# Patient Record
Sex: FEMALE | Race: WHITE | URBAN - METROPOLITAN AREA
[De-identification: names, ages, dates, MRNs, and addresses within clinical notes are randomized per-mention and may not be internally consistent; named-entity substitution may affect disease eponyms.]

---

## 2024-01-15 ENCOUNTER — OFFICE VISIT (OUTPATIENT)
Age: 59
End: 2024-01-15
Payer: MEDICAID

## 2024-01-15 VITALS
TEMPERATURE: 97.2 F | DIASTOLIC BLOOD PRESSURE: 72 MMHG | BODY MASS INDEX: 38.58 KG/M2 | WEIGHT: 226 LBS | HEART RATE: 69 BPM | HEIGHT: 64 IN | SYSTOLIC BLOOD PRESSURE: 136 MMHG | OXYGEN SATURATION: 98 %

## 2024-01-15 DIAGNOSIS — M43.16 SPONDYLOLISTHESIS, LUMBAR REGION: ICD-10-CM

## 2024-01-15 DIAGNOSIS — M47.817 LUMBOSACRAL SPONDYLOSIS WITHOUT MYELOPATHY: ICD-10-CM

## 2024-01-15 DIAGNOSIS — M51.37 DDD (DEGENERATIVE DISC DISEASE), LUMBOSACRAL: ICD-10-CM

## 2024-01-15 DIAGNOSIS — M54.16 LUMBAR NEURITIS: ICD-10-CM

## 2024-01-15 DIAGNOSIS — M54.50 LUMBAR SPINE PAIN: Primary | ICD-10-CM

## 2024-01-15 PROCEDURE — 72100 X-RAY EXAM L-S SPINE 2/3 VWS: CPT | Performed by: PHYSICAL MEDICINE & REHABILITATION

## 2024-01-15 PROCEDURE — 99204 OFFICE O/P NEW MOD 45 MIN: CPT | Performed by: PHYSICAL MEDICINE & REHABILITATION

## 2024-01-15 RX ORDER — ASPIRIN 325 MG
325 TABLET ORAL DAILY
COMMUNITY

## 2024-01-15 RX ORDER — LEVOTHYROXINE SODIUM 0.05 MG/1
50 TABLET ORAL DAILY
COMMUNITY

## 2024-01-15 RX ORDER — ACETAMINOPHEN 160 MG
TABLET,DISINTEGRATING ORAL
COMMUNITY

## 2024-01-15 RX ORDER — PANTOPRAZOLE SODIUM 40 MG/1
40 TABLET, DELAYED RELEASE ORAL DAILY
COMMUNITY

## 2024-01-15 RX ORDER — MECLIZINE HYDROCHLORIDE 25 MG/1
25 TABLET ORAL 3 TIMES DAILY PRN
COMMUNITY

## 2024-01-15 RX ORDER — MONTELUKAST SODIUM 10 MG/1
10 TABLET ORAL NIGHTLY
COMMUNITY

## 2024-01-15 RX ORDER — BACLOFEN 10 MG/1
10 TABLET ORAL 3 TIMES DAILY
COMMUNITY
Start: 2023-12-28

## 2024-01-15 RX ORDER — ROSUVASTATIN CALCIUM 10 MG/1
10 TABLET, COATED ORAL DAILY
COMMUNITY

## 2024-01-15 RX ORDER — CELECOXIB 100 MG/1
100 CAPSULE ORAL 2 TIMES DAILY
COMMUNITY

## 2024-01-15 RX ORDER — LISINOPRIL 10 MG/1
10 TABLET ORAL DAILY
COMMUNITY

## 2024-01-15 RX ORDER — ESTRADIOL 0.5 MG/1
0.5 TABLET ORAL DAILY
COMMUNITY

## 2024-01-15 RX ORDER — BACLOFEN 5 MG/1
5 TABLET ORAL 3 TIMES DAILY PRN
COMMUNITY
Start: 2023-12-28

## 2024-01-15 RX ORDER — FAMOTIDINE 40 MG/1
40 TABLET, FILM COATED ORAL NIGHTLY PRN
COMMUNITY
Start: 2024-01-04

## 2024-01-15 RX ORDER — SPIRONOLACTONE 25 MG/1
25 TABLET ORAL DAILY
COMMUNITY

## 2024-01-15 RX ORDER — MULTIVIT WITH MINERALS/LUTEIN
250 TABLET ORAL DAILY
COMMUNITY

## 2024-01-15 RX ORDER — VITAMIN B COMPLEX
1 CAPSULE ORAL DAILY
COMMUNITY

## 2024-01-15 RX ORDER — GABAPENTIN 300 MG/1
300 CAPSULE ORAL 3 TIMES DAILY
Qty: 90 CAPSULE | Refills: 1 | Status: SHIPPED | OUTPATIENT
Start: 2024-01-15 | End: 2024-03-15

## 2024-01-15 NOTE — PROGRESS NOTES
[facility]  [address]  [place] [id number]  Tel: [phone]  Fax: [phone]          INITIAL CONSULTATION      HISTORY OF PRESENT ILLNESS:  Rosanna Roman is a 58 y.o. female who is referred from Rivka Lewis NP secondary to lumbar facet arthropathy and SI joint inflammation. She rates her pain 10/10. Patient comes into the office with c/o low back pain radiating to the LLE in a S1 distribution to the foot, involves digit 5. She says her symptoms started in 2023 w/o injury. Patient says her pain is progressive in nature.Pt denies change in bowel or bladder habits. Her pain is exacerbated by no position. Patient denies recent fevers, weight loss, rashes, or skin sores. No stomach ulcers or bleeding disorders. No history of spinal surgery or injections. She had not physical therapy or chiropractic care. The patient has a history of DM and reports blood sugars are well controlled, consistently remaining below 200, followed by Dr. Solorzano, endocrinologist in [place].Patient reports that to her knowledge her kidneys function properly, no labs available. She did not tolerate the Tramadol. She had some benefit with tylenol. She has taken Celebrex without benefit. She took Baclofen without benefit. She is currently taking Aspirin. She has taken Prednisone x 2 with temporary benefit in 2023 PmHx of DM, HTN. Note from Rivka Lewis NP dated 1/3/2024 indicating: XR of left hip is normal but shows degenerative changes in the lumbar spine. Recommended she continue with PT and referred to me.       The patient is Right Handed.  reviewed. Tramadol 2024 from Rivka Lewis NP Body mass index is 38.79 kg/m².    PCP: Rivka Lewis APRN - NP    History reviewed. No pertinent past medical history.       Past Surgical History:   Procedure Laterality Date    ANKLE FRACTURE SURGERY       SECTION      HYSTERECTOMY, VAGINAL

## 2024-01-19 ENCOUNTER — TELEPHONE (OUTPATIENT)
Age: 59
End: 2024-01-19

## 2024-01-19 NOTE — TELEPHONE ENCOUNTER
Patient states the Gabapentin medication she was prescribed is not easing her pain, and is asking if there is anything else she can take to help with her pain. Patient is asking for a call back , and can be reached at [phone].

## 2024-01-19 NOTE — TELEPHONE ENCOUNTER
Patient verified first and last name on her vm. Left a vm stating that rx can take a minimum of two weeks to see any effect. Office number was provided for call back if patient had any more questions or concerns.

## 2024-01-24 ENCOUNTER — APPOINTMENT (OUTPATIENT)
Age: 59
End: 2024-01-24
Payer: MEDICAID

## 2024-01-31 ENCOUNTER — HOSPITAL ENCOUNTER (OUTPATIENT)
Age: 59
Setting detail: RECURRING SERIES
Discharge: HOME | End: 2024-02-03
Payer: MEDICAID

## 2024-01-31 PROCEDURE — 97161 PT EVAL LOW COMPLEX 20 MIN: CPT

## 2024-01-31 NOTE — THERAPY EVALUATION
THORACOLUMBAR EVAL/ PT DAILY TREATMENT NOTE 10-18    Patient Name: Rosanna Roman  Date:2024  : 1965  [x]  Patient  Verified  Payor: [facility] MEDICAID / Plan: [facility] EXP ANTH HEALTHKEEPERS PLUS / Product Type: *No Product type* /    In time:1355  Out time:1450  Total Treatment Time (min): 55  Visit #: 1      Treatment Area: Low back pain, unspecified [M54.50]  Spondylolisthesis, lumbar region [M43.16]  Other intervertebral disc degeneration, lumbosacral region [M51.37]  Radiculopathy, lumbar region [M54.16]  Spondylosis without myelopathy or radiculopathy, lumbosacral region [M47.817]      Subjective:     Pt reports L sided back pain that goes all the way down to her L toes. Pain started in 2023. LLE pain started late December. Is on gabapentin since 3 weeks ago, has been helping. Walking helps relieve pain but sometimes it makes it worse. Pt lives in one story home with , 5 steps to get into home.     Patient Goals: be able to do housework     Pain Level (0-10 scale): current: 2/10 Best: 0/10 worst: 10/10  []Sharp  []Dull  []Achy []Burning []Throbbing []N&T []Other:  []constant []intermittent []improving []worsening []no change since onset      Symptoms:  Aggravated by:   [x] Bending [] Sitting [] Standing [x] Walking   [x] Moving [] Cough [] Sneeze [] Valsalva   [] AM  [] PM  Lying:  [] sup   [] pro   [] sidelying   [] Other:     Eased by:    [] Bending [x] Sitting [x] Standing [] Walking   [] Moving [] AM  [] PM  Lying: [] sup  [] pro  [x] sidelying   [] Other:     General Health:  Red Flags Indicated? [] Yes    [x] No    No past medical history on file.    Any medication changes, allergies to medications, adverse drug reactions, diagnosis change, or new procedure performed?: [x] No    [] Yes (see summary sheet for update)    OBJECTIVE      Active Movements: [] N/A   [] Too acute   [] Other:  Thoracolumbar ROM  AROM PROM Comments:pain, area   Forward flexion  75%   
limitation and / or participation in recreation  Presentation: LOW Complexity : Stable, uncomplicated  Clinical Decision Making:MEDIUM Complexity : FOTO score of 26-74Overall Complexity:LOW     Problem List: pain affecting function, decrease ROM, decrease strength, decrease ADL/ functional abilitiies, decrease activity tolerance, and decrease flexibility/ joint mobility   Treatment Plan may include any combination of the following: Theraputic Exercise, Moist Heat, Cryotherapy, Electrical Stimulation, Traction, Manual Therapy, Gait and Balance Training, and Teaching of a HEP  Patient / Family readiness to learn indicated by: asking questions, trying to perform skills, interest, return verbalization , and return demonstration   Persons(s) to be included in education: patient (P)  Barriers to Learning/Limitations: No      Patient self reported health status: good  Rehabilitation Potential: good    Objective Measures:    Active Movements: [] N/A   [] Too acute   [] Other:  Thoracolumbar ROM  AROM PROM Comments:pain, area   Forward flexion  75%     Extension  50%     SB right  50%     SB left  50%     Rotation right  75%     Rotation left  75%         Neuro Screen [x] WNL    Dural Mobility:  SLR Sitting: [] R    [] L    [] +    [x] -  @ (degrees):           Supine: [] R    [x] L    [x] +    [] -  @ (degrees):   Slump Test: [] R    [] L    [] +    [x] -  @ (degrees):   Prone Knee Bend: [] R    [] L    [] +    [] -             AROM                       PROM    MMT    Left Right Left Right Left Right   Hip Flexion     4 4+    Extension     3+ 4    Abduction     3+ 4    Adduction          Internal Rotation          External rotation         Knee Flexion     5 5    Extension     5 5   Ankle Dorsiflexion     5 5    Plantarflexion     5 5    Inversion          Eversion             Special Tests         Hip: Arthur:  [] R    [] L    [] +    [] -     Scour:  [] R    [] L    [] +    [] -     Piriformis: [] R    [x] L    [x] +

## 2024-02-02 ENCOUNTER — HOSPITAL ENCOUNTER (OUTPATIENT)
Age: 59
Setting detail: RECURRING SERIES
Discharge: HOME | End: 2024-02-05
Payer: MEDICAID

## 2024-02-02 PROCEDURE — 97110 THERAPEUTIC EXERCISES: CPT

## 2024-02-02 PROCEDURE — 97112 NEUROMUSCULAR REEDUCATION: CPT

## 2024-02-02 NOTE — PROGRESS NOTES
Physical Therapy  PT DAILY TREATMENT NOTE 8-    Patient Name: Rosanna Roman  Date:2024  : 1965  [x]  Patient  Verified  Payor: [facility] MEDICAID / Plan: [facility] EXP ANTH HEALTHKEEPERS PLUS / Product Type: *No Product type* /    In time:1136  Out time:1215  Total Treatment Time (min): 39  Total Timed Codes (min): 39  1:1 Treatment Time (min): 39   Visit #: 2 of 12    Treatment Area: Low back pain, unspecified [M54.50]  Spondylolisthesis, lumbar region [M43.16]  Other intervertebral disc degeneration, lumbosacral region [M51.37]  Radiculopathy, lumbar region [M54.16]  Spondylosis without myelopathy or radiculopathy, lumbosacral region [M47.817]    SUBJECTIVE  Patient states LBP 2/10 today with minimum pain down L LE.    Pain Level (0-10 scale): 2/10    Any medication changes, allergies to medications, adverse drug reactions, diagnosis change, or new procedure performed?: [x] No    [] Yes (see summary sheet for update)        OBJECTIVE  15 min Therapeutic Exercise:  [x] See flow sheet.   Rationale: increase ROM and increase strength to improve the patient’s ability to decrease pain with ADL performance.       24 min Neuromuscular Re-education:  [x]  See flow sheet.   Rationale: increase strength and improve coordination  to improve the patient’s ability to activate core mm for increased protection of back with ADL performance.           With TE  TA   NR  GT   Misc Patient Education: [x] Review HEP    [] Progressed/Changed HEP based on:   [] positioning   [] body mechanics   [] transfers   [] heat/ice application          Pain Level (0-10 scale) post treatment: 2/10    ASSESSMENT/Changes in Function: Patient begins session on stepper for 10' set to level 1, challenging LE strength, activity endurance & encouraging normal motion of low back & pelvis. Patient follows with mat activities to stretch piriformis, decreasing pain in L LE; patient states she feels a stretch more so when performing R

## 2024-02-05 ENCOUNTER — HOSPITAL ENCOUNTER (OUTPATIENT)
Age: 59
Setting detail: RECURRING SERIES
Discharge: HOME | End: 2024-02-08
Payer: MEDICAID

## 2024-02-05 PROCEDURE — 97110 THERAPEUTIC EXERCISES: CPT

## 2024-02-05 PROCEDURE — 97112 NEUROMUSCULAR REEDUCATION: CPT

## 2024-02-05 NOTE — PROGRESS NOTES
Physical Therapy  PT DAILY TREATMENT NOTE     Patient Name: Rosanna Roman  Date:2024  : 1965  [x]  Patient  Verified  Payor: [facility] MEDICAID / Plan: [facility] EXP ANTH HEALTHKEEPERS PLUS / Product Type: *No Product type* /    In time:926  Out time:1003  Total Treatment Time (min): 37  Total Timed Codes (min): 37  1:1 Treatment Time (min): 37   Visit #: 3 of 12    Treatment Area: Low back pain, unspecified [M54.50]  Spondylolisthesis, lumbar region [M43.16]  Other intervertebral disc degeneration, lumbosacral region [M51.37]  Radiculopathy, lumbar region [M54.16]  Spondylosis without myelopathy or radiculopathy, lumbosacral region [M47.817]    SUBJECTIVE  Patient states no change in pain level since last visit. Patient states she felt soreness on R hip & R-sided trunk area post-PT last session.    Pain Level (0-10 scale): 2/10    Any medication changes, allergies to medications, adverse drug reactions, diagnosis change, or new procedure performed?: [x] No    [] Yes (see summary sheet for update)        OBJECTIVE    22 min Therapeutic Exercise:  [x] See flow sheet.   Rationale: increase ROM and increase strength to improve the patient’s ability to perform ADL with increased strength & safety.       15 min Neuromuscular Re-education:  [x]  See flow sheet.   Rationale: increase strength and muscular re-education of core   to improve the patient’s ability to perform ADL with increased core awareness with performance.           With TE  TA   NR  GT   Misc Patient Education: [x] Review HEP    [] Progressed/Changed HEP based on:   [] positioning   [] body mechanics   [] transfers   [] heat/ice application          Pain Level (0-10 scale) post treatment: 2/10    ASSESSMENT/Changes in Function: Patient begins session on stepper, loosening low back joints, preparing patient for therapeutic & neuromuscular re-education exercises ahead. Patient performs mat activities with no cues needed when

## 2024-02-07 ENCOUNTER — HOSPITAL ENCOUNTER (OUTPATIENT)
Age: 59
Setting detail: RECURRING SERIES
Discharge: HOME | End: 2024-02-10
Payer: MEDICAID

## 2024-02-07 PROCEDURE — 97140 MANUAL THERAPY 1/> REGIONS: CPT

## 2024-02-07 PROCEDURE — 97112 NEUROMUSCULAR REEDUCATION: CPT

## 2024-02-07 PROCEDURE — 97110 THERAPEUTIC EXERCISES: CPT

## 2024-02-07 NOTE — PROGRESS NOTES
Physical Therapy  PT DAILY TREATMENT NOTE     Patient Name: Rosanna Roman  Date:2024  : 1965  [x]  Patient  Verified  Payor: [facility] MEDICAID / Plan: [facility] EXP ANTH HEALTHKEEPERS PLUS / Product Type: *No Product type* /    In time:955  Out time:1040  Total Treatment Time (min): 45  Total Timed Codes (min): 45  1:1 Treatment Time (min): 45   Visit #: 4 of 12    Treatment Area: Low back pain, unspecified [M54.50]  Spondylolisthesis, lumbar region [M43.16]  Other intervertebral disc degeneration, lumbosacral region [M51.37]  Radiculopathy, lumbar region [M54.16]  Spondylosis without myelopathy or radiculopathy, lumbosacral region [M47.817]    SUBJECTIVE  Patient states she has been stiff throughout entire trunk area since last visit & pain that shoots from L hip up into low back.    Pain Level (0-10 scale): 2/10    Any medication changes, allergies to medications, adverse drug reactions, diagnosis change, or new procedure performed?: [x] No    [] Yes (see summary sheet for update)        OBJECTIVE    21 min Therapeutic Exercise:  [x] See flow sheet.   Rationale: increase ROM, increase strength, and improve coordination to improve the patient’s ability to perform ADL with little to no pain.      14 min Neuromuscular Re-education:  [x]  See flow sheet.   Rationale: increase strength and improve coordination  to improve the patient’s ability to activate core mm throughout ADL performance.     10 min Manual Therapy:  Patient receives soft tissue massage, moderate pressure, to L gluteal region in attempts to relax tense mm to alleviate sciatica pain. Patient receives different massage techniques for relaxation & TP work to decrease mm knots in glute mm of L hip.   Rationale: decrease pain, increase ROM, increase tissue extensibility, and decrease trigger points to decrease pain when performing ADL.               With TE  TA   NR  GT   Misc Patient Education: [x] Review HEP    []

## 2024-02-12 ENCOUNTER — HOSPITAL ENCOUNTER (OUTPATIENT)
Age: 59
Setting detail: RECURRING SERIES
Discharge: HOME | End: 2024-02-15
Payer: MEDICAID

## 2024-02-12 PROCEDURE — 97112 NEUROMUSCULAR REEDUCATION: CPT

## 2024-02-12 PROCEDURE — 97140 MANUAL THERAPY 1/> REGIONS: CPT

## 2024-02-12 PROCEDURE — 97110 THERAPEUTIC EXERCISES: CPT

## 2024-02-12 NOTE — PROGRESS NOTES
Physical Therapy  PT DAILY TREATMENT NOTE 8-    Patient Name: Rosanna Roman  Date:2024  : 1965  [x]  Patient  Verified  Payor: [facility] MEDICAID / Plan: [facility] EXP ANTH HEALTHKEEPERS PLUS / Product Type: *No Product type* /    In time:1030  Out time:1115  Total Treatment Time (min): 45  Total Timed Codes (min): 45  1:1 Treatment Time (min): 45   Visit #: 5 of 12    Treatment Area: Low back pain, unspecified [M54.50]  Spondylolisthesis, lumbar region [M43.16]  Other intervertebral disc degeneration, lumbosacral region [M51.37]  Radiculopathy, lumbar region [M54.16]  Spondylosis without myelopathy or radiculopathy, lumbosacral region [M47.817]    SUBJECTIVE  Patient states her pain has changed from radiating down her L LE to staying directly in center of low back. PTA explains the benefit of this change in location, centralizing pain. Patient asked several times if she needed to get another appointment with her PCP regarding pain & PTA encourages patient to give PT time to see if the continued exercises & stretches relieve low back pain.    Pain Level (0-10 scale): 3/10    Any medication changes, allergies to medications, adverse drug reactions, diagnosis change, or new procedure performed?: [x] No    [] Yes (see summary sheet for update)        OBJECTIVE    15 min Therapeutic Exercise:  [x] See flow sheet.   Rationale: increase ROM and increase strength to improve the patient’s ability to perform ADL with increased strength & safety awareness.      15 min Neuromuscular Re-education:  [x]  See flow sheet.   Rationale: increase strength and improve coordination  to improve the patient’s ability to properly contract TA when performing ADL, protecting back from injury & pain.    15 min Manual Therapy:  Patient receives soft tissue massage to low back region & R gluteal region to decrease pain & TP causing pain. Patient has stated during Piriformis stretches that her R feels tighter than her

## 2024-02-14 ENCOUNTER — TELEPHONE (OUTPATIENT)
Age: 59
End: 2024-02-14

## 2024-02-14 DIAGNOSIS — M47.817 LUMBOSACRAL SPONDYLOSIS WITHOUT MYELOPATHY: ICD-10-CM

## 2024-02-14 DIAGNOSIS — M51.37 DDD (DEGENERATIVE DISC DISEASE), LUMBOSACRAL: ICD-10-CM

## 2024-02-14 DIAGNOSIS — M54.50 LUMBAR SPINE PAIN: Primary | ICD-10-CM

## 2024-02-14 DIAGNOSIS — M43.16 SPONDYLOLISTHESIS, LUMBAR REGION: ICD-10-CM

## 2024-02-14 DIAGNOSIS — M54.16 LUMBAR NEURITIS: ICD-10-CM

## 2024-02-14 RX ORDER — GABAPENTIN 600 MG/1
600 TABLET ORAL 3 TIMES DAILY
Qty: 90 TABLET | Refills: 1 | Status: SHIPPED | OUTPATIENT
Start: 2024-02-14 | End: 2024-04-14

## 2024-02-14 NOTE — TELEPHONE ENCOUNTER
Patient advised this is her 5th w/physical theraphy and she is still having pain L Buttock & Center of Back she gets stiff in the morning down to her toes on L side- she stated it was working at first now the pain has coming back

## 2024-02-14 NOTE — TELEPHONE ENCOUNTER
I spoke to  using two patient identifiers.Name and  . She has mentioned the issues she is having with the exercises with her physical therapist and will follow up with them tomorrow for her scheduled appointment. She stated she is doing well on the gabapentin 300 mg tid but she would like to try the increase to 600 mg tid.  I have left the instructions for the increase on her vm as requested. I also informed her on her vm if she has any questions or concerns about the increase to call our office. Office number was provided.

## 2024-02-15 ENCOUNTER — HOSPITAL ENCOUNTER (OUTPATIENT)
Age: 59
Setting detail: RECURRING SERIES
Discharge: HOME | End: 2024-02-18
Payer: MEDICAID

## 2024-02-15 PROCEDURE — 97110 THERAPEUTIC EXERCISES: CPT

## 2024-02-15 NOTE — PROGRESS NOTES
PT DAILY TREATMENT NOTE     Patient Name: Rosanna Roman  Date:2/15/2024  : 1965  [x]  Patient  Verified  Payor: [facility] MEDICAID / Plan: [facility] EXP [facility] HEALTHKEEPERS PLUS / Product Type: *No Product type* /    In time:1008  Out time:1055  Total Treatment Time (min): 47  Total Timed Codes (min): 47  1:1 Treatment Time (min): 47   Visit #: 6 of 12    Treatment Area: Low back pain, unspecified [M54.50]  Spondylolisthesis, lumbar region [M43.16]  Other intervertebral disc degeneration, lumbosacral region [M51.37]  Radiculopathy, lumbar region [M54.16]  Spondylosis without myelopathy or radiculopathy, lumbosacral region [M47.817]    SUBJECTIVE  Pt reports low pain today, and states that her doctor has increased her gabapentin dosage.    Pain Level (0-10 scale): 2/10    Any medication changes, allergies to medications, adverse drug reactions, diagnosis change, or new procedure performed?: [x] No    [] Yes (see summary sheet for update)        OBJECTIVE  Modality rationale: NA today   Min Type Additional Details    [] Estim: []Att   []Unatt  []TENS instruct                 []IFC  []Premod []NMES                       []Other:  []w/US   []w/ice   []w/heat  Position:  Location:    []  Traction: [] Cervical       []Lumbar                       [] Prone          []Supine                       []Intermittent   []Continuous Lbs:  [] before manual  [] after manual    []  Ultrasound: []Continuous   [] Pulsed                           []1MHz   []3MHz Location:  W/cm2:    []  Iontophoresis with dexamethasone         Location: [] Take home patch   [] In clinic    []  Ice     []  heat  []  Ice massage Position:  Location:    []  Vasopneumatic Device Pressure: [] lo [] med [] hi   Temp: [] lo [] med [] hi   [] Skin assessment post-treatment:  []intact []redness- no adverse reaction       []redness - adverse reaction:           47 min Therapeutic Exercise:  [x] See flow sheet :   Rationale: increase ROM,

## 2024-02-20 ENCOUNTER — HOSPITAL ENCOUNTER (OUTPATIENT)
Age: 59
Setting detail: RECURRING SERIES
Discharge: HOME | End: 2024-02-23
Payer: MEDICAID

## 2024-02-20 PROCEDURE — 97110 THERAPEUTIC EXERCISES: CPT

## 2024-02-20 NOTE — PROGRESS NOTES
PT DAILY TREATMENT NOTE 8-    Patient Name: Rosanna Roman  Date:2024  : 1965  [x]  Patient  Verified  Payor: [facility] MEDICAID / Plan: [facility] EXP ANTH HEALTHKEEPERS PLUS / Product Type: *No Product type* /    In time:1020  Out time:1053  Total Treatment Time (min): 33  Total Timed Codes (min): 33  1:1 Treatment Time (min): 33   Visit #: 7    Treatment Area: Low back pain, unspecified [M54.50]  Spondylolisthesis, lumbar region [M43.16]  Other intervertebral disc degeneration, lumbosacral region [M51.37]  Radiculopathy, lumbar region [M54.16]  Spondylosis without myelopathy or radiculopathy, lumbosacral region [M47.817]    SUBJECTIVE  Pt staes today is a good day     Pain Level (0-10 scale): 0    Any medication changes, allergies to medications, adverse drug reactions, diagnosis change, or new procedure performed?: [x] No    [] Yes (see summary sheet for update)        OBJECTIVE            33 min Therapeutic Exercise:  [x] See flow sheet :   Rationale: increase ROM and increase strength to improve the patient’s ability to return to prior level of function before injury/illness with reduced pain, achieving optimal strength and function to perform household tasks, daily activities, and return to community events, and/or work.                    With TE  TA   NR  GT   [facility] Patient Education: [x] Review HEP    [] Progressed/Changed HEP based on:   [] positioning   [] body mechanics   [] transfers   [] heat/ice application          Pain Level (0-10 scale) post treatment: 0    ASSESSMENT/Changes in Function: Began session today with warm up on stepper, followed by seated piriformis, and sciatic nerve glides to reduce tension in B LE s. Followed by supine stability and strengthening exercises as noted per flow sheet. Plan to continue to progress as pt tolerance allows. Modalities declined today. Cont POC.      Patient will continue to benefit from skilled PT services to modify and progress

## 2024-02-23 ENCOUNTER — HOSPITAL ENCOUNTER (OUTPATIENT)
Age: 59
Setting detail: RECURRING SERIES
Discharge: HOME | End: 2024-02-26
Payer: MEDICAID

## 2024-02-23 PROCEDURE — 97112 NEUROMUSCULAR REEDUCATION: CPT

## 2024-02-23 PROCEDURE — 97110 THERAPEUTIC EXERCISES: CPT

## 2024-02-23 NOTE — PROGRESS NOTES
Physical Therapy  PT DAILY TREATMENT NOTE 8    Patient Name: Rosanna Roman  Date:2024  : 1965  [x]  Patient  Verified  Payor: [facility] MEDICAID / Plan: [facility] EXP ANTH HEALTHKEEPERS PLUS / Product Type: *No Product type* /    In time:1006  Out time:39  Total Treatment Time (min): 39  Total Timed Codes (min): 39  1:1 Treatment Time (min): 39   Visit #: 8 of 12    Treatment Area: Low back pain, unspecified [M54.50]  Spondylolisthesis, lumbar region [M43.16]  Other intervertebral disc degeneration, lumbosacral region [M51.37]  Radiculopathy, lumbar region [M54.16]  Spondylosis without myelopathy or radiculopathy, lumbosacral region [M47.817]    SUBJECTIVE  Patient states low back pain has decreased since MD increased pain med dosage.    Pain Level (0-10 scale): 1/10    Any medication changes, allergies to medications, adverse drug reactions, diagnosis change, or new procedure performed?: [x] No    [] Yes (see summary sheet for update)        OBJECTIVE    24 min Therapeutic Exercise:  [x] See flow sheet.   Rationale: increase ROM and increase strength to improve the patient’s ability to perform ADL with little to no pain.       15 min Neuromuscular Re-education:  [x]  See flow sheet.   Rationale: increase strength and improve coordination  to improve the patient’s ability to properly activate core mm to decrease risk of back injury during ADL performance.             With TE  TA   NR  GT   Misc Patient Education: [x] Review HEP    [] Progressed/Changed HEP based on:   [] positioning   [] body mechanics   [] transfers   [] heat/ice application          Pain Level (0-10 scale) post treatment: 1/10    ASSESSMENT/Changes in Function: Patient begins session with mat activities, performing therapeutic exercises & neuromuscular re-ed exercises per flow sheet. Patient states she hasn't felt her piriformis stretches when doing at home, requiring PTA to manually place patient in stretch for proper

## 2024-02-27 ENCOUNTER — HOSPITAL ENCOUNTER (OUTPATIENT)
Age: 59
Setting detail: RECURRING SERIES
Discharge: HOME | End: 2024-03-01
Payer: MEDICAID

## 2024-02-27 PROCEDURE — 97110 THERAPEUTIC EXERCISES: CPT

## 2024-02-27 NOTE — PROGRESS NOTES
PT DAILY TREATMENT NOTE 8    Patient Name: Rosanna Roman  Date:2024  : 1965  [x]  Patient  Verified  Payor: [facility] MEDICAID / Plan: [facility] EXP ANTH HEALTHKEEPERS PLUS / Product Type: *No Product type* /    In time:0900  Out time:0935  Total Treatment Time (min): 35  Total Timed Codes (min): 35  1:1 Treatment Time (min): 35   Visit #: 9     Treatment Area: Low back pain, unspecified [M54.50]  Spondylolisthesis, lumbar region [M43.16]  Other intervertebral disc degeneration, lumbosacral region [M51.37]  Radiculopathy, lumbar region [M54.16]  Spondylosis without myelopathy or radiculopathy, lumbosacral region [M47.817]    SUBJECTIVE  Pt states she feels back pain mostly when lying down     Pain Level (0-10 scale): 2    Any medication changes, allergies to medications, adverse drug reactions, diagnosis change, or new procedure performed?: [x] No    [] Yes (see summary sheet for update)        OBJECTIVE          35 min Therapeutic Exercise:  [x] See flow sheet :   Rationale: increase ROM, increase strength, and improve coordination to improve the patient’s ability to properly activate core mm to decrease risk of back injury during ADL performance.         Rationale:           With TE  TA   NR  GT   [facility] Patient Education: [x] Review HEP    [] Progressed/Changed HEP based on:   [] positioning   [] body mechanics   [] transfers   [] heat/ice application          Pain Level (0-10 scale) post treatment: 0    ASSESSMENT/Changes in Function: Pt began session with mat exercises per flowsheet which included supine for piriformis stretches 30\"x3, bridges 2x15, PPT 10x 5\", S/L clams progressed to Blue T band , LTR 10x10\" each, ended treatment on stepper level 1.5 x 10\". Pt had no complaints and no increase in symptoms after treatment    Patient will continue to benefit from skilled PT services to modify and progress therapeutic interventions, analyze and address functional mobility deficits, analyze

## 2024-03-01 ENCOUNTER — HOSPITAL ENCOUNTER (OUTPATIENT)
Age: 59
Setting detail: RECURRING SERIES
Discharge: HOME | End: 2024-03-04
Payer: MEDICAID

## 2024-03-01 PROCEDURE — 97530 THERAPEUTIC ACTIVITIES: CPT

## 2024-03-01 NOTE — THERAPY RECERTIFICATION
[facility] REHABILITATION  PHYSICAL THERAPY  [address] [facility], [address] * Phone: [phone] * Fax: [phone]  DISCHARGE SUMMARY FOR PHYSICAL THERAPY            Patient Name: Rosanna Roman : 1965   Treatment/Medical Diagnosis: Low back pain, unspecified [M54.50]  Spondylolisthesis, lumbar region [M43.16]  Other intervertebral disc degeneration, lumbosacral region [M51.37]  Radiculopathy, lumbar region [M54.16]  Spondylosis without myelopathy or radiculopathy, lumbosacral region [M47.817]   Onset Date: 2023     Referral Source: Hernando Rodriguez MD Start of Care (SOC): 24   Prior Hospitalization: See Medical History Provider #: [id number]   Prior Level of Function: Pain free and Ind with all ADLs   Comorbidities: See chart   Medications: Verified on Patient Summary List   Visits from SOC: 10 Missed Visits: 0       Subjective:  Pt reports 50% improvements since SOC. Said she continues to have pain and takes pain meds. Said the pain in her leg is better less frequent. MD increased dosage. Pt reports she wants to continue exercises at home instead.    Objective Measures:     Active Movements: [] N/A   [] Too acute   [] Other:  Thoracolumbar ROM  AROM PROM Comments:pain, area   Forward flexion  75%       Extension  50%       SB right  50%       SB left  50%       Rotation right  75%       Rotation left  75%             Neuro Screen [x] WNL     Dural Mobility:  SLR Sitting:     [] R    [] L    [] +    [x] -  @ (degrees):           Supine:    [x] R    [x] L    [] +    [x] -  @ (degrees):   Slump Test:     [] R    [] L    [] +    [x] -  @ (degrees):   Prone Knee Bend:      [] R    [] L    [] +    [] -                                                          AROM                       PROM                         MMT      Left Right Left Right Left Right   Hip Flexion         4 4+     Extension         4 4     Abduction         4 4     Adduction

## 2024-03-01 NOTE — PROGRESS NOTES
[]redness - adverse reaction:       24 min Therapeutic Activity:  [x]  See flow sheet :   Rationale: reassessment done          With KEITH VIEIRA  GT   [facility]: [x] Review HEP    [] Progressed/Changed HEP based on:   [] positioning   [] body mechanics   [] transfers   [] heat/ice application          Pain Level (0-10 scale) post treatment: 2/10    ASSESSMENT/Changes in Function: Performed reassessment today. Will DC d/t lack of progress.        []  See Plan of Care  []  See progress note/recertification  [x]  See Discharge Summary             PLAN  []  Upgrade activities as tolerated     []  Continue plan of care  []  Update interventions per flow sheet       [x]  Discharge due to:_  []  Other:_      Perla Neal, PT 3/1/2024  2:03 PM

## 2024-03-14 NOTE — PROGRESS NOTES
+4/5 +4/5 +4/5 +4/5   Left +4/5 +4/5 +4/5 +4/5 +4/5 +4/5 +4/5     SENSATION: Sensation is intact to light touch throughout.         ASSESSMENT   Rosanna was seen today for lower back pain.    Diagnoses and all orders for this visit:    Lumbar spine pain  -     gabapentin (NEURONTIN) 600 MG tablet; Take 1 tablet by mouth 3 times daily for 90 days. Max Daily Amount: 1,800 mg    Spondylolisthesis, lumbar region  -     gabapentin (NEURONTIN) 600 MG tablet; Take 1 tablet by mouth 3 times daily for 90 days. Max Daily Amount: 1,800 mg    DDD (degenerative disc disease), lumbosacral  -     gabapentin (NEURONTIN) 600 MG tablet; Take 1 tablet by mouth 3 times daily for 90 days. Max Daily Amount: 1,800 mg    Lumbar neuritis  -     gabapentin (NEURONTIN) 600 MG tablet; Take 1 tablet by mouth 3 times daily for 90 days. Max Daily Amount: 1,800 mg    Lumbosacral spondylosis without myelopathy  -     gabapentin (NEURONTIN) 600 MG tablet; Take 1 tablet by mouth 3 times daily for 90 days. Max Daily Amount: 1,800 mg          IMPRESSION AND PLAN:  Patient returns to the office today with c/o  low back pain radiating to the LLE in a S1 distribution to the foot, involves digit 5. Multiple treatment options were discussed. She is not interested in surgery or injection at this time.  I discussed changing her neuropathic medication, secondary to her feeling \"loopy\", pt declined. She wished to continue with the current treatment plan. I recommended the patient continue to perform her HEP everyday. I will refill her Gabapentin 600 mg TID. The patient is Neurologically intact. I will see the patient back in 3 months or earlier if needed.     Written by Chrissy Marcelino, as dictated by Hernando Rodriguez MD  I examined the patient, reviewed and agree with the note.

## 2024-03-18 ENCOUNTER — OFFICE VISIT (OUTPATIENT)
Age: 59
End: 2024-03-18
Payer: MEDICAID

## 2024-03-18 VITALS
SYSTOLIC BLOOD PRESSURE: 110 MMHG | DIASTOLIC BLOOD PRESSURE: 65 MMHG | WEIGHT: 231 LBS | BODY MASS INDEX: 39.44 KG/M2 | HEIGHT: 64 IN | OXYGEN SATURATION: 95 % | TEMPERATURE: 97.2 F | HEART RATE: 82 BPM

## 2024-03-18 DIAGNOSIS — M54.16 LUMBAR NEURITIS: ICD-10-CM

## 2024-03-18 DIAGNOSIS — M54.50 LUMBAR SPINE PAIN: Primary | ICD-10-CM

## 2024-03-18 DIAGNOSIS — M43.16 SPONDYLOLISTHESIS, LUMBAR REGION: ICD-10-CM

## 2024-03-18 DIAGNOSIS — M51.37 DDD (DEGENERATIVE DISC DISEASE), LUMBOSACRAL: ICD-10-CM

## 2024-03-18 DIAGNOSIS — M47.817 LUMBOSACRAL SPONDYLOSIS WITHOUT MYELOPATHY: ICD-10-CM

## 2024-03-18 PROCEDURE — 99214 OFFICE O/P EST MOD 30 MIN: CPT | Performed by: PHYSICAL MEDICINE & REHABILITATION

## 2024-03-18 RX ORDER — GABAPENTIN 600 MG/1
600 TABLET ORAL 3 TIMES DAILY
Qty: 90 TABLET | Refills: 2 | Status: SHIPPED | OUTPATIENT
Start: 2024-03-18 | End: 2024-06-16

## 2024-06-03 ENCOUNTER — HOSPITAL ENCOUNTER (OUTPATIENT)
Age: 59
Discharge: HOME | End: 2024-06-06
Payer: MEDICAID

## 2024-06-03 DIAGNOSIS — R13.14 DYSPHAGIA, PHARYNGOESOPHAGEAL PHASE: ICD-10-CM

## 2024-06-03 PROCEDURE — 2500000003 HC RX 250 WO HCPCS: Performed by: PHYSICIAN ASSISTANT

## 2024-06-03 PROCEDURE — 74220 X-RAY XM ESOPHAGUS 1CNTRST: CPT

## 2024-06-03 PROCEDURE — 6370000000 HC RX 637 (ALT 250 FOR IP): Performed by: PHYSICIAN ASSISTANT

## 2024-06-03 RX ADMIN — BARIUM SULFATE 355 ML: 0.6 SUSPENSION ORAL at 09:28

## 2024-06-03 RX ADMIN — ANTACID/ANTIFLATULENT 1 EACH: 380; 550; 10; 10 GRANULE, EFFERVESCENT ORAL at 09:20

## 2024-06-03 RX ADMIN — BARIUM SULFATE 1 TABLET: 700 TABLET ORAL at 09:38

## 2024-06-03 RX ADMIN — BARIUM SULFATE 140 ML: 980 POWDER, FOR SUSPENSION ORAL at 09:22

## 2024-07-14 NOTE — PROGRESS NOTES
time. I recommended she increase the frequency of her HEP to daily. I discussed changing her neuropathic medication, pt declined. She wished to continue with the current treatment plan. I refilled her Gabapentin 600 mg TID.   The patient is Neurologically intact. I will see the patient back in 3 months or earlier if needed.     Written by Sonal Hinson medical scribe, as dictated by Hernando Rodriguez MD  I examined the patient, reviewed and agree with the note.

## 2024-07-15 ENCOUNTER — OFFICE VISIT (OUTPATIENT)
Age: 59
End: 2024-07-15
Payer: MEDICAID

## 2024-07-15 VITALS
BODY MASS INDEX: 41.32 KG/M2 | TEMPERATURE: 98 F | HEIGHT: 64 IN | WEIGHT: 242 LBS | HEART RATE: 84 BPM | OXYGEN SATURATION: 99 %

## 2024-07-15 DIAGNOSIS — M47.817 LUMBOSACRAL SPONDYLOSIS WITHOUT MYELOPATHY: ICD-10-CM

## 2024-07-15 DIAGNOSIS — M43.16 SPONDYLOLISTHESIS, LUMBAR REGION: ICD-10-CM

## 2024-07-15 DIAGNOSIS — M54.16 LUMBAR NEURITIS: ICD-10-CM

## 2024-07-15 DIAGNOSIS — M51.37 DDD (DEGENERATIVE DISC DISEASE), LUMBOSACRAL: ICD-10-CM

## 2024-07-15 DIAGNOSIS — M54.50 LUMBAR SPINE PAIN: Primary | ICD-10-CM

## 2024-07-15 PROCEDURE — 99214 OFFICE O/P EST MOD 30 MIN: CPT | Performed by: PHYSICAL MEDICINE & REHABILITATION

## 2024-07-15 RX ORDER — GABAPENTIN 600 MG/1
600 TABLET ORAL 3 TIMES DAILY
Qty: 270 TABLET | Refills: 0 | Status: SHIPPED | OUTPATIENT
Start: 2024-07-15 | End: 2024-10-13

## 2024-07-15 RX ORDER — INSULIN LISPRO 100 [IU]/ML
INJECTION, SOLUTION INTRAVENOUS; SUBCUTANEOUS
COMMUNITY
Start: 2024-04-16

## 2024-10-16 ENCOUNTER — OFFICE VISIT (OUTPATIENT)
Age: 59
End: 2024-10-16
Payer: MEDICARE

## 2024-10-16 VITALS
BODY MASS INDEX: 39.95 KG/M2 | HEIGHT: 64 IN | WEIGHT: 234 LBS | OXYGEN SATURATION: 97 % | HEART RATE: 75 BPM | TEMPERATURE: 98 F

## 2024-10-16 DIAGNOSIS — M47.817 LUMBOSACRAL SPONDYLOSIS WITHOUT MYELOPATHY: ICD-10-CM

## 2024-10-16 DIAGNOSIS — M43.16 SPONDYLOLISTHESIS, LUMBAR REGION: ICD-10-CM

## 2024-10-16 DIAGNOSIS — M54.50 LUMBAR SPINE PAIN: Primary | ICD-10-CM

## 2024-10-16 DIAGNOSIS — M54.16 LUMBAR NEURITIS: ICD-10-CM

## 2024-10-16 DIAGNOSIS — M51.372 DEGENERATION OF INTERVERTEBRAL DISC OF LUMBOSACRAL REGION WITH DISCOGENIC BACK PAIN AND LOWER EXTREMITY PAIN: ICD-10-CM

## 2024-10-16 PROCEDURE — 99214 OFFICE O/P EST MOD 30 MIN: CPT | Performed by: PHYSICAL MEDICINE & REHABILITATION

## 2024-10-16 RX ORDER — GABAPENTIN 600 MG/1
600 TABLET ORAL 3 TIMES DAILY
Qty: 270 TABLET | Refills: 1 | Status: SHIPPED | OUTPATIENT
Start: 2024-10-16 | End: 2025-04-14

## 2024-10-16 RX ORDER — TIRZEPATIDE 2.5 MG/.5ML
2.5 INJECTION, SOLUTION SUBCUTANEOUS WEEKLY
COMMUNITY
Start: 2024-10-04

## 2024-10-16 RX ORDER — FAMOTIDINE 40 MG/1
40 TABLET, FILM COATED ORAL 2 TIMES DAILY
COMMUNITY
Start: 2024-08-21

## 2024-10-16 NOTE — PROGRESS NOTES
[facility]  [address]  [place] [id number]  Tel: [phone]  Fax: [phone]          PROGRESS NOTE      HISTORY OF PRESENT ILLNESS:  The patient is a 59 y.o. female and was seen today for follow up of lower back pain with paraesthesias into the BLE, additionally c/o radicular pain into the LLE in a S1 distribution to the foot involving digit 5. Previously seen for low back pain radiating to the LLE in a S1 distribution to the foot, involves digit 5. She says her symptoms started in September 2023 w/o injury. Patient says her pain is progressive in nature.Pt denies change in bowel or bladder habits. Her pain is exacerbated by no position. Patient denies recent fevers, weight loss, rashes, or skin sores. No stomach ulcers or bleeding disorders. No history of spinal surgery or injections. She had not physical therapy or chiropractic care. The patient has a history of DM and reports blood sugars are well controlled, consistently remaining below 200, followed by Dr. Solorzano, endocrinologist in [place].Patient reports that to her knowledge her kidneys function properly, no labs available. She did not tolerate the Tramadol. She had some benefit with tylenol. She has taken Celebrex without benefit. She took Baclofen without benefit. She is currently taking Aspirin. She has taken Prednisone x 2 with temporary benefit in December 2023 PmHx of DM, HTN. Note from Rivka Lewis NP dated 1/3/2024 indicating: XR of left hip is normal but shows degenerative changes in the lumbar spine. Recommended she continue with PT and referred to me. Lumbar spine plain films dated 1/15/2024. 2 views: AP and Lateral. revealed: Sacralization of L5. Grade 1 anterolisthesis of L4 on L5. Mild disc space narrowing at L4-5. No acute pathology identified.  I referred her to physical therapy with an emphasis on HEP. I tried her on Gabapentin 300 mg TID. The risks, benefits, and

## 2025-04-16 ENCOUNTER — OFFICE VISIT (OUTPATIENT)
Age: 60
End: 2025-04-16
Payer: MEDICARE

## 2025-04-16 VITALS
HEART RATE: 61 BPM | HEIGHT: 64 IN | WEIGHT: 239 LBS | BODY MASS INDEX: 40.8 KG/M2 | TEMPERATURE: 98 F | OXYGEN SATURATION: 97 %

## 2025-04-16 DIAGNOSIS — M43.16 SPONDYLOLISTHESIS, LUMBAR REGION: ICD-10-CM

## 2025-04-16 DIAGNOSIS — M51.372 DEGENERATION OF INTERVERTEBRAL DISC OF LUMBOSACRAL REGION WITH DISCOGENIC BACK PAIN AND LOWER EXTREMITY PAIN: ICD-10-CM

## 2025-04-16 DIAGNOSIS — M47.817 LUMBOSACRAL SPONDYLOSIS WITHOUT MYELOPATHY: ICD-10-CM

## 2025-04-16 DIAGNOSIS — M54.16 LUMBAR NEURITIS: ICD-10-CM

## 2025-04-16 DIAGNOSIS — M54.50 LUMBAR SPINE PAIN: Primary | ICD-10-CM

## 2025-04-16 PROCEDURE — 99214 OFFICE O/P EST MOD 30 MIN: CPT | Performed by: PHYSICAL MEDICINE & REHABILITATION

## 2025-04-16 RX ORDER — PANTOPRAZOLE SODIUM 40 MG/1
TABLET, DELAYED RELEASE ORAL
COMMUNITY
Start: 2025-03-29

## 2025-04-16 RX ORDER — GABAPENTIN 600 MG/1
600 TABLET ORAL 3 TIMES DAILY
Qty: 270 TABLET | Refills: 1 | Status: SHIPPED | OUTPATIENT
Start: 2025-04-16 | End: 2025-10-13

## 2025-04-16 NOTE — PROGRESS NOTES
[facility]  [address]  [place] [id number]  Tel: [phone]  Fax: [phone]          PROGRESS NOTE      HISTORY OF PRESENT ILLNESS:  The patient is a 59 y.o. female and was seen today for follow up of lower back pain with paraesthesias into the BLE, additionally c/o radicular pain into the LLE in a S1 distribution to the foot involving digit 5. Previously seen for low back pain radiating to the LLE in a S1 distribution to the foot, involves digit 5. She says her symptoms started in September 2023 w/o injury. Patient says her pain is progressive in nature.Pt denies change in bowel or bladder habits. Her pain is exacerbated by no position. Patient denies recent fevers, weight loss, rashes, or skin sores. No stomach ulcers or bleeding disorders. No history of spinal surgery or injections. She had not physical therapy or chiropractic care. The patient has a history of DM and reports blood sugars are well controlled, consistently remaining below 200, followed by Dr. Solorzano, endocrinologist in [place].Patient reports that to her knowledge her kidneys function properly, no labs available. She did not tolerate the Tramadol. She had some benefit with tylenol. She has taken Celebrex without benefit. She took Baclofen without benefit. She is currently taking Aspirin. She has taken Prednisone x 2 with temporary benefit in December 2023 PmHx of DM, HTN. Note from Rivka Lewis NP dated 1/3/2024 indicating: XR of left hip is normal but shows degenerative changes in the lumbar spine. Recommended she continue with PT and referred to me. Lumbar spine plain films dated 1/15/2024. 2 views: AP and Lateral. revealed: Sacralization of L5. Grade 1 anterolisthesis of L4 on L5. Mild disc space narrowing at L4-5. No acute pathology identified.  I referred her to physical therapy with an emphasis on HEP. I tried her on Gabapentin 300 mg TID. The risks, benefits, and potential

## 2025-06-20 ENCOUNTER — TELEPHONE (OUTPATIENT)
Age: 60
End: 2025-06-20

## 2025-06-20 NOTE — TELEPHONE ENCOUNTER
Referral for Gastroesophageal reflux disease without esophagitis. Please review and advise.    Referral  Referral # [id number]  Referral Information    Referral # Creation Date Referral Status Status Update    [id number] 06/20/2025 Pending Review 06/20/2025: Status History     Status Reason Referral Type Referral Reasons Referral Class   Pending Physician Review Eval and Treat Specialty Services Required Incoming     To Specialty To Provider To Location/Place of Service To Department   Gastroenterology John Lee MD none HR [facility] - GASTROENTEROLOGY     To Vendor Referred By By Location/Place of Service By Department   none Eduardo Escobar PA none none     Priority Start Date Expiration Date Referral Entered By   Routine 06/20/2025 06/20/2026 Uzma Mcduffie MA     Visits Requested Visits Authorized Visits Completed Visits Scheduled   2 2       Coverages    Payer Plan Auth. Required? Covered? Member # Authorized From Expires Auth # Precert. # Comment   [facility] [employer] -- Covered [id number] 1/1/2025 -- -- -- --     Referral Information    Referral # Creation Date Referral Status Status Update    [id number] 06/20/2025 Pending Review 06/20/2025: Status History     Status Reason Referral Type Referral Reasons Referral Class   Pending Physician Review Eval and Treat Specialty Services Required Incoming     To Specialty To Provider To Location/Place of Service To Department   Gastroenterology John Lee MD none HR [facility] - GASTROENTEROLOGY     To Vendor Referred By By Location/Place of Service By Department   none Eduardo Escobar PA none none     Priority Start Date Expiration Date Referral Entered By   Routine 06/20/2025 06/20/2026 Uzma Mcduffie MA     Visits Requested Visits Authorized Visits Completed Visits Scheduled   2 2       Procedure Information    Service Details  Procedure Modifiers Provider